# Patient Record
Sex: FEMALE | URBAN - METROPOLITAN AREA
[De-identification: names, ages, dates, MRNs, and addresses within clinical notes are randomized per-mention and may not be internally consistent; named-entity substitution may affect disease eponyms.]

---

## 2021-09-28 ENCOUNTER — TELEPHONE (OUTPATIENT)
Dept: OBGYN | Age: 46
End: 2021-09-28

## 2021-09-28 NOTE — TELEPHONE ENCOUNTER
Patient from your previous office. Patient called and states she was to have a surgery with you and that her blood work results were abnormal and that prevented her from having the surgery. Patent requesting to have repeat blood work to see if her levels are WNL so she can have the surgery scheduled.   Please advise

## 2021-09-28 NOTE — TELEPHONE ENCOUNTER
Patient called back in and stated that she had blood work drawn today at PCP, she had them redraw a thyroid level. Patient also stated her vaginal bleeding is still terrible and would like this done ASAP. I asked patient if she knew what she needed done and she said it is an ablation but you knew that.

## 2021-11-11 ENCOUNTER — OFFICE VISIT (OUTPATIENT)
Dept: OBGYN | Age: 46
End: 2021-11-11
Payer: COMMERCIAL

## 2021-11-11 VITALS
RESPIRATION RATE: 16 BRPM | DIASTOLIC BLOOD PRESSURE: 60 MMHG | HEART RATE: 78 BPM | SYSTOLIC BLOOD PRESSURE: 116 MMHG | WEIGHT: 129 LBS | BODY MASS INDEX: 20.73 KG/M2 | HEIGHT: 66 IN

## 2021-11-11 DIAGNOSIS — N84.0 ENDOMETRIAL POLYP: ICD-10-CM

## 2021-11-11 DIAGNOSIS — E03.8 OTHER SPECIFIED HYPOTHYROIDISM: ICD-10-CM

## 2021-11-11 DIAGNOSIS — N93.9 ABNORMAL UTERINE BLEEDING (AUB): Primary | ICD-10-CM

## 2021-11-11 PROCEDURE — 99203 OFFICE O/P NEW LOW 30 MIN: CPT | Performed by: OBSTETRICS & GYNECOLOGY

## 2021-11-11 RX ORDER — LEVOTHYROXINE SODIUM 0.12 MG/1
125 TABLET ORAL DAILY
COMMUNITY

## 2021-11-11 RX ORDER — FERROUS SULFATE 325(65) MG
325 TABLET ORAL
COMMUNITY

## 2021-11-11 RX ORDER — DOCUSATE SODIUM 100 MG/1
100 CAPSULE, LIQUID FILLED ORAL 2 TIMES DAILY
COMMUNITY

## 2021-11-11 RX ORDER — MULTIVIT WITH MINERALS/LUTEIN
250 TABLET ORAL DAILY
COMMUNITY

## 2021-11-11 NOTE — PROGRESS NOTES
HISTORY OF PRESENT ILLNESS:    The patient presents today for surgical consultation. Diagnosis: AUB, endometrial polyp  Procedure: Diagnostic hysteroscopy, D&C, myosure polypectomy, Novasure endometrial ablation  Diagnostic findings: uterus 8.7 cm, 2.5 cm endometrial polyp vs pedunculated fibroid, elevated prolactin-resolved, elevated TSH  Contraception tubal  Comorbidities: tobacco abuse, hypothyroidism, tubal, c/s cervical conization, anemia    Pt previously scheduled for surgery at [facility] however it was postponed due to uncontrolled hypothyroidism and a TSH of 262. Pt reports synthroid increase 3 weeks ago. She states she goes next week to have repeat TSH. Past Medical History:   Past Medical History:   Diagnosis Date    Abnormal Papanicolaou smear of cervix     Abnormal uterine bleeding (AUB)     Hypothyroidism     Iron deficiency                                              OB History    Para Term  AB Living   4 4       4   SAB IAB Ectopic Molar Multiple Live Births                    # Outcome Date GA Lbr Jayme/2nd Weight Sex Delivery Anes PTL Lv   4 Para     M Vag-Spont      3 Para     F Vag-Spont      2 Para     F CS-Unspec      1 Para     F Vag-Spont            Past Surgical History:   Past Surgical History:   Procedure Laterality Date     SECTION      LEEP      TUBAL LIGATION          Allergies: Patient has no known allergies. Medications:   Current Outpatient Medications   Medication Sig Dispense Refill    ferrous sulfate (IRON 325) 325 (65 Fe) MG tablet Take 325 mg by mouth daily (with breakfast)      docusate sodium (COLACE) 100 MG capsule Take 100 mg by mouth 2 times daily      levothyroxine (SYNTHROID) 125 MCG tablet Take 125 mcg by mouth Daily      Ascorbic Acid (VITAMIN C) 250 MG tablet Take 250 mg by mouth daily       No current facility-administered medications for this visit.          Social History:   Social History     Tobacco Use    Smoking status: Current Every Day Smoker    Smokeless tobacco: Never Used   Substance Use Topics    Alcohol use: Not Currently        Family History:   History reviewed. No pertinent family history. REVIEW OF SYSTEMS:    Constitutional: negative  HEENT: negative  Breast: negative  Respiratory: negative  Cardiovascular: negative  Gastrointestinal: negative  Genitourinary: negative  Integument: negative  Neurological: negative  Endocrine: negative          PHYSICAL EXAM  /60   Pulse 78   Resp 16   Ht 5' 6\" (1.676 m)   Wt 129 lb (58.5 kg)   LMP 11/06/2021   BMI 20.82 kg/m²       General appearance: alert, cooperative and in no acute distress. Head: NCAT   Psych: No acute distress, mood and affect appropriate                 ASSESSMENT :      Diagnosis Orders   1. Abnormal uterine bleeding (AUB)     2. Endometrial polyp     3. Other specified hypothyroidism          PLAN:    R/B/A explained and informed consent obtained. Discussed getting repeat TSH, if normalized will schedule surgery then. Offered medical mgmt for bleeding in the mean time however pt declines. She will have lab results sent to us. No follow-ups on file. No orders of the defined types were placed in this encounter. No orders of the defined types were placed in this encounter.         Electronically signed by Malika Abbasi DO on 11/11/21